# Patient Record
Sex: FEMALE | Race: BLACK OR AFRICAN AMERICAN | Employment: PART TIME | URBAN - METROPOLITAN AREA
[De-identification: names, ages, dates, MRNs, and addresses within clinical notes are randomized per-mention and may not be internally consistent; named-entity substitution may affect disease eponyms.]

---

## 2017-09-18 ENCOUNTER — HOSPITAL ENCOUNTER (OUTPATIENT)
Dept: PHYSICAL THERAPY | Age: 57
Discharge: HOME | End: 2017-09-18
Payer: COMMERCIAL

## 2017-09-18 PROCEDURE — 97110 THERAPEUTIC EXERCISES: CPT

## 2017-09-18 PROCEDURE — 97162 PT EVAL MOD COMPLEX 30 MIN: CPT

## 2017-09-18 PROCEDURE — 97530 THERAPEUTIC ACTIVITIES: CPT

## 2017-09-18 NOTE — PROGRESS NOTES
[address]  [facility] PHYSICAL THERAPY  [address] [facility] J[address]elyn Vir[address]ontes, [address] - Phone: [phone]  Fax: [phone] / 0692 [facility]  Patient Name: Shakeel Barcenas : 1960   Medical   Diagnosis: Lower back pain [M54.5] Treatment Diagnosis: LBP w/ LLE radiculopathy   Onset Date:  w/ exacerbation 1 month ago AGE: 61 y.o. Referral Source: Willow English MD [facility]): 2017   Prior Hospitalization: See medical history Provider #: [id number]   Prior Level of Function: Sedentary lifestyle with intermittent LBP/radiculopathy since    Comorbidities: BMI   Medications: Verified on Patient Summary List   The Plan of Care and following information is based on the information from the initial evaluation.   =======================================================================================  Assessment / key information:  Patient is a 61 y.o. female who presents with chief complaint of low back pain with associated LLE pain. Pt reports initial onset of LBP in  following lifting incident at work. Patient states she found relief with conservative treatment following this injury including PT and cortisone injections. Pt presents to PT evaluation today with recent exacerbation of lumbar radicular symptoms x 1 month. Pt demonstrates s/s consistent with L/S posterior derangement. Pt demo dec sensation to light touch L lateral and anterior thigh down to L ankle. Patient responded very well to L/S extension repeated movements demonstrating ability to centralize symptoms to L lateral thigh in prone position. In addition to radicular symptoms, patient presents with dec LLE strength, dec LE flexibility, dec and painful bed mobility/transfers, dec sitting/standing tolerance and currently reports being unable to work.   Patient would benefit from skilled PT services to address these issues and improve the above mentioned impairments. Thank you for this referral       Objective Data:    LE Strength                   Strength (1-5)  Hip Left Right   Flexion (L1,L2) 4 5   Extension 2- 4   Abduction 3+ 4+   Adduction   NT   ER 4 4+   IR 4 4+   Knee Left Right   Extension (L3,L4) 3+ 5   Flexion (S1,S2) 4+ 5   Ankle Left Right   PF (S1) NT NT   DF (L4) 5 5       ======================================================================================  Eval Complexity: History: HIGH Complexity :3+ comorbidities / personal factors will impact the outcome/ POC Exam:MEDIUM Complexity : 3 Standardized tests and measures addressing body structure, function, activity limitation and / or participation in recreation  Presentation: MEDIUM Complexity : Evolving with changing characteristics  Clinical Decision Making:MEDIUM Complexity : FOTO score of 26-74Overall Complexity:MEDIUM  Problem List: pain affecting function, decrease ROM, decrease strength, edema affecting function, impaired gait/ balance, decrease ADL/ functional abilitiies, decrease activity tolerance, decrease flexibility/ joint mobility and decrease transfer abilities   Treatment Plan may include any combination of the following: Therapeutic exercise, Therapeutic activities, Neuromuscular re-education, Physical agent/modality, Gait/balance training, Manual therapy, Aquatic therapy, Patient education, Self Care training, Functional mobility training and Home safety training  Patient / Family readiness to learn indicated by: asking questions, trying to perform skills and interest  Persons(s) to be included in education: patient (P)  Barriers to Learning/Limitations: yes;  financial and physical  Measures taken:    Patient Goal (s): Decrease pain, improve function and avoid surgery. Patient self reported health status: good  Rehabilitation Potential: good   Short Term Goals: To be accomplished in  3  Weeks:  1.  Patient will be compliant with HEP in order to facilitate progression of therapeutic exercise and improve mobility  2. Patient will demonstrate ability to independently centralize LLE radicular symptoms to level of L/S to dec LLE dysfunction  3. Patient will demonstrate bed mobility with LBP < 5/10 in order to improve ease with transfers     Long Term Goals: To be accomplished in  6  Weeks:  1. Patient will be independent with HEP in order to demonstrate ability to perform therapeutic exercises and continue progressing functional mobility upon D/C  2. Patient will demonstrate bed mobility without inc LBP in order to improve ease with transfers   3. Patient will improve FOTO score to 43% to demonstrate a meaningful improvement in functional mobility and increased quality of life  4. Patient will demonstrate ability to bend/lift 20# box without inc LBP in order to return to work tasks. 5. Patient will report ability to stand > 30 minutes without inc LLE symptoms in order to return work duties. Frequency / Duration:   Patient to be seen  2  times per week for 6  weeks:  Patient / Caregiver education and instruction: self care, activity modification and exercises  G-Codes (GP): n/a  Therapist Signature: Madeline Mcgill DPT Date: 7/12/4464   Certification Period: n/a Time: 8:53 AM   ===========================================================================================  I certify that the above Physical Therapy Services are being furnished while the patient is under my care. I agree with the treatment plan and certify that this therapy is necessary. Physician Signature:        Date:       Time:     Please sign and return to [employer] or you may fax the signed copy to [phone]. Thank you.

## 2017-09-18 NOTE — PROGRESS NOTES
PHYSICAL THERAPY - DAILY TREATMENT NOTE    Patient Name: Karla Steward        Date: 2017  : 1960   YES Patient  Verified  Visit #:     Insurance: Payor: [employer] [address] / Plan: Nelli Alicea / Product Type: Workers Comp /      In time: 1:05 Out time: 2:00   Total Treatment Time: 55 min     TREATMENT AREA =  LBP w/ LLE radiculopathy    SUBJECTIVE    Pain Level (on 0 to 10 scale):  7  / 10   Medication Changes/New allergies or changes in medical history, any new surgeries or procedures? NO    If yes, update Summary List   Subjective Functional Status/Changes:  []  No changes reported     Patient reports onset of low back pain in  after lifting something heavy at work. Patient reports PT after this time with good reports that she had injections as well. Patient reports being relatively pain-free with the ocassional aches since this time. Pt states about 3 weeks ago she came home from work and woke up at night with extreme pain in the low back and into her left leg. Pt states that she is out of work right now due to the pain. Pt reports her left leg has a throbbing pain and sometimes some numbness on the side on the side of the leg. Patient reports the pain in her left leg can get so bad that she can't put any weight on her left leg. States that the pain moves to different spots in the left leg. Pt reports the left leg pain stops at the ankle.      Occupation: [employer] employee          OBJECTIVE    Physical Therapy Evaluation - Lumbar Spine ([facility])    SUBJECTIVE  Chief Complaint: LBP w/ LLE radiculopathy    Mechanism of injury: Injury lifting at work in     Occupation/Recreation: Employee at [address] [place] [address]  Prior level of function: Intermittent LBP, sedentary lifestyle  Present functional limitations: Unable to work, decreased standing/sitting tolerance, sleep, ADLs and self care duties  What position do you sleep in?: Prone and sidelying    Symptoms:  Pain rating (0-10): Today: 7/10   Best: 5/10   Worst: 10/10   [x] Contstant:    [] Intermittent:     Aggravated by:   [] Bending [x] Sitting [x] Standing [] Walking   [] Moving [] Cough [] Sneeze [] Valsalva   [] AM  [] PM  Lying:  [] sup   [] pro   [] sidelying   [] Other:     Eased by: Medication   [] Bending [] Sitting [] Standing [] Walking   [] Moving [] AM  [] PM  Lying: [] sup  [] pro  [] sidelying   [] Other:     General Health:  Red Flags Indicated? [] Yes    [] No  [] Yes [x] No Recent weight change (If yes, due to dieting?  [] Yes  [] No)   [x] Yes [] No Weakness in legs during walking  [] Yes [x] No Unremitting pain at night  [] Yes [x] No Abdominal pain or problems  [] Yes [x] No Rectal bleeding  [] Yes [x] No Feet more cold or painful in cold weather  [] Yes [x] No Menstrual irregularities  [] Yes [x] No Blood or pain with urination  [] Yes [x] No Dysfunction of bowel or bladder  [] Yes [x] No Recent illness within past 3 weeks (i.e, cold, flu)  [] Yes [x] No Numbness/tingling in buttock/genitalia region    Past History/Treatments:     Diagnostic Tests: [] Lab work [] X-rays    [] CT [] MRI     [] Other:  Results: None    OBJECTIVE  Posture:  Lateral Shift: [x] R    [] L     [x] +  [] -  Kyphosis: [x] Increased [] Decreased   []  WNL  Lordosis:  [x] Increased [] Decreased   [] WNL    Gait:  [] Normal     [x] Abnormal: bilateral trendelenburg gait, antalgic gait pattern, dec gait speed, slight inc R lateral lean, dec step length bilaterally    Active Movements: [] N/A   [] Too acute   [] Other:  ROM AROM Comments:pain, area   Forward flexion 40-60 Fingertips to distal tibia    Extension 20-30 WNL    SB right 20-30 NT    SB left 20-30 NT    Rotation right 5-10 NT    Rotation left 5-10 NT       Repeated Movements   Effects on present pain: produces (CT), abolishes (A), increases (incr), decreases(decr), centralizes (C), peripheral (PH), no effect (NE)   Pre-Test Sx Flexion Repeated Flexion Extension Repeated Extension Repeated SBL Repeated SBR   Sitting 7/10 p! LLE         Standing  Incr incr NE Decr     Lying    C C N/A N/A       LE Strength       Strength (1-5)  Hip Left Right   Flexion (L1,L2) 4 5   Extension 2- 4   Abduction 3+ 4+   Adduction  NT   ER 4 4+   IR 4 4+   Knee Left Right   Extension (L3,L4) 3+ 5   Flexion (S1,S2) 4+ 5   Ankle Left Right   PF (S1) NT NT   DF (L4) 5 5       Neuro Screen [] WNL  Myotome/Dermatome/Reflexes: Decr sensation to light touch LLE vs RLE    Dural Mobility:  SLR Sitting: [] R    [x] L    [] +    [x] -  @ (degrees):           Supine: [] R    [x] L    [] +    [x] -  @ (degrees):   Slump Test: [] R    [x] L    [] +    [x] -  @ (degrees):   Prone Knee Bend: [] R    [x] L    [] +    [x] -     Palpation  [] Min  [x] Mod  [x] Severe    Location: Lateral thigh, anterior tibia, L lumbar paraspinals, L gluteals    15 min Therapeutic Exercise:  [x]  See flow sheet   Rationale:      increase ROM, increase strength, improve coordination and increase proprioception to improve the patients ability to centralize L/S symptoms to reduce LE dysfunction     10 min Therapeutic Activity: FOTO   Rationale: To assess current functional limitations and review POC    throughout min Patient Education:  YES  Reviewed HEP   []  Progressed/Changed HEP based on: Other Objective/Functional Measures:    See objective data above     Post Treatment Pain Level (on 0 to 10) scale:   5 / 10     ASSESSMENT    Assessment/Changes in Function:     Patient History: High (BMI, Inability to work, Chronic LBP, Sedentary lifestyle)  Examination (low 1-2, mod 3+, high 4+): Moderate per objective above  Clinical Presentation (low stable or uncomplicated, mod evolving or changing, high unstable or unpredictable):  Moderate  Clinical Decision Making (low , mod 26-74, high 1-25): FOTO Moderate     [x]  See Progress Note/Recertification   Patient will continue to benefit from skilled PT services to modify and progress therapeutic interventions, address functional mobility deficits, address ROM deficits, address strength deficits, analyze and address soft tissue restrictions, analyze and cue movement patterns, analyze and modify body mechanics/ergonomics, assess and modify postural abnormalities, address imbalance/dizziness and instruct in home and community integration to attain remaining goals.    Progress toward goals / Updated goals:    See plan of care     PLAN    [x]  Upgrade activities as tolerated YES Continue plan of care   []  Discharge due to :    []  Other:

## 2017-09-21 ENCOUNTER — HOSPITAL ENCOUNTER (OUTPATIENT)
Dept: PHYSICAL THERAPY | Age: 57
Discharge: HOME | End: 2017-09-21
Payer: COMMERCIAL

## 2017-09-21 PROCEDURE — 97110 THERAPEUTIC EXERCISES: CPT

## 2017-09-21 PROCEDURE — 97530 THERAPEUTIC ACTIVITIES: CPT

## 2017-09-21 NOTE — PROGRESS NOTES
PHYSICAL THERAPY - DAILY TREATMENT NOTE    Patient Name: Madeleine Camacho        Date: 2017  : 1960   yes Patient  Verified  Visit #:     Insurance: Payor: [employer] / Plan: Harley Boast / Product Type:  Workers Comp /      In time: 56 Out time: 1112   Total Treatment Time: 42     TREATMENT AREA =  Lower back pain [M54.5]    SUBJECTIVE  Pain Level (on 0 to 10 scale):  6  / 10   Medication Changes/New allergies or changes in medical history, any new surgeries or procedures?    no  If yes, update Summary List   Subjective Functional Status/Changes:  []  No changes reported     \"I feel it down my L leg some today, but sometimes it gets numb and it feels like my leg will give out\"          OBJECTIVE  Modalities Rationale:     decrease inflammation and decrease pain to improve patient's ability to perform ADLs/ bending/stooping/ lifting/prolong sitting, stding and amb/ stairs with ease '   min [] Estim, type/location:                                      []  att     []  unatt     []  w/US     []  w/ice    []  w/heat    min []  Mechanical Traction: type/lbs                   []  pro   []  sup   []  int   []  cont    []  before manual    []  after manual    min []  Ultrasound, settings/location:      min []  Iontophoresis w/ dexamethasone, location:                                               []  take home patch       []  in clinic   10 min [x]  Ice     []  Heat    location/position: prone with wedge under LEs      min []  Vasopneumatic Device, press/temp:     min []  Other:    [x] Skin assessment post-treatment (if applicable):    [x]  intact    []  redness- no adverse reaction     []redness  adverse reaction:        24 min Therapeutic Exercise:  [x]  See flow sheet   Rationale:      increase ROM, increase strength and improve coordination to improve the patients ability to perform ADLs/ bending/stooping/ lifting/prolong sitting, stding and amb/ stairs with ease        8 min Therapeutic Activity: postural/bed mobility training     Rationale:    increase ROM and increase strength to improve       min Patient Education:  yes  Reviewed HEP   []  Progressed/Changed HEP based on: Pt ed on importance and benefits of compliance with HEP, core strength/stability and proper posture; pt verbalized understanding          Other Objective/Functional Measures:  PITA-B/B decrease p! REIL/ROGERIO-centralized to L/s   peripheralization with seated hip flex; abolished with ROGERIO HOC R; instructed to perform every hr x 10    VCs + demo to perform proper technique for TE    Reviewed proper bed mobility, lifting techniques and importance and benefits of a neutral spine   Post Treatment Pain Level (on 0 to 10) scale:   3  / 10 L/s     ASSESSMENT  Assessment/Changes in Function:     Progressed there-ex without c/o increase p! []  See Progress Note/Recertification   Patient will continue to benefit from skilled PT services to modify and progress therapeutic interventions, address functional mobility deficits, address ROM deficits, address strength deficits, analyze and address soft tissue restrictions, analyze and cue movement patterns, analyze and modify body mechanics/ergonomics, assess and modify postural abnormalities and instruct in home and community integration to attain remaining goals.    Progress toward goals / Updated goals:    Pt's first visit since IE, no noted progress        PLAN  []  Upgrade activities as tolerated yes Continue plan of care   []  Discharge due to :    []  Other:      Therapist: Rd Jara PTA    Date: 9/21/2017 Time: 3:36 PM     Future Appointments  Date Time Provider Elijah Rodriguez   9/26/2017 1:30 PM Cynthia Ariza PT [facility]   9/27/2017 1:30 PM [facility]   10/2/2017 1:00 PM [facility]   10/4/2017 1:30 PM [facility]   10/9/2017 1:00 PM [facility]   10/11/2017 1:00 PM Rd Jara PTA [facility] 10/16/2017 1:00 PM Zuhair Barragan PTA [facility]   10/18/2017 1:00 PM [facility]   10/23/2017 1:00 PM Dong [facility]   10/25/2017 1:30 PM Dong [facility]   10/30/2017 1:00 PM Zuhair Barragan PTA [facility]

## 2017-09-26 ENCOUNTER — HOSPITAL ENCOUNTER (OUTPATIENT)
Dept: PHYSICAL THERAPY | Age: 57
Discharge: HOME | End: 2017-09-26
Payer: COMMERCIAL

## 2017-09-26 PROCEDURE — 97110 THERAPEUTIC EXERCISES: CPT

## 2017-09-26 NOTE — PROGRESS NOTES
PHYSICAL THERAPY - DAILY TREATMENT NOTE    Patient Name: Ector Inman        Date: 2017  : 1960   YES Patient  Verified  Visit #:   3   of   12  Insurance: Payor: [employer] [address] / Plan: Yash Raya / Product Type: Workers Comp /      In time: 1:30 Out time: 2:20   Total Treatment Time: 50     TREATMENT AREA = Lower back pain [M54.5]    SUBJECTIVE    Pain Level (on 0 to 10 scale):  0  / 10   Medication Changes/New allergies or changes in medical history, any new surgeries or procedures? NO    If yes, update Summary List   Subjective Functional Status/Changes:  []  No changes reported     \"I've been better since last week. I don't have any pain today. \"           OBJECTIVE    40 min Therapeutic Exercise:  [x]  See flow sheet   Rationale:      increase ROM, increase strength/stability, facilitate proper motor control. Modalities Rationale: Prophylaxis/Palliative    min [] Estim, type/location:                                     []  att     []  unatt     []  w/US     []  w/ice    []  W/heat    []  before manual    []  after manual    min []  Mechanical Traction: type/lbs                   []  pro   []  sup   []  int   []  cont    []  before manual    []  after manual    min []  Ultrasound, settings/location:      min []  Iontophoresis w/ dexamethasone, location:                                               []  take home patch       []  in clinic   10 min [x]  Ice     []  Heat    Position/location: Prone, L/S    min []  Vasopneumatic Device, press/temp:    [] Skin assessment post-treatment (if applicable):    []  intact    []  redness- no adverse reaction     []redness  adverse reaction:      Throughout Rx min Patient Education:  YES   Reviewed HEP   []  Progressed/Changed HEP based on:   Display of proper form in clinic. Improvement in condition and complaints     Other Objective/Functional Measures: Added new stabilization therex per flow sheet.    Attempted seated march, but peripheralized. Performed REIL/ROGERIO throughout treatment b/w sets/reps/exercises as needed as prophylaxis and symptoms management. Post Treatment Pain Level (on 0 to 10) scale:   0  / 10     ASSESSMENT    Assessment/Changes in Function:     Derangement not fully reduced      []  See Progress Note/Recertification   Patient will continue to benefit from skilled PT services to modify and progress therapeutic interventions, address functional mobility deficits, address ROM deficits, address strength deficits, analyze and address soft tissue restrictions, analyze and cue movement patterns, analyze and modify body mechanics/ergonomics and instruct in home and community integration  to attain remaining goals   Progress toward goals / Updated goals:    Centralizing well with ROGERIO     PLAN    [x]  Upgrade activities as tolerated YES Continue plan of care   []  Discharge due to :    []  Other:      Therapist: Sherri Rolon \"EMELINA\" Bridger Hoodr, DPT, Cert. MDT, Cert. DN, Cert.  SMT    Date: 9/26/2017 Time: 1:40 PM   Future Appointments  Date Time Provider Elijah Rodriguez   9/27/2017 1:30 PM Steff [facility]   10/2/2017 1:00 PM Iven Divine, [facility]   10/4/2017 1:30 PM Iven Divine, [facility]   10/9/2017 1:00 PM Iven Divine, [facility]   10/11/2017 1:00 PM Iven Divine, [facility]   10/16/2017 1:00 PM Iven Divine, [facility]   10/18/2017 1:00 PM Iven Divine, [facility]   10/23/2017 1:00 PM Iven Divine, [facility]   10/25/2017 1:30 PM Iven Divine, [facility]   10/30/2017 1:00 PM Iven Divine, [facility]

## 2017-09-27 ENCOUNTER — HOSPITAL ENCOUNTER (OUTPATIENT)
Dept: PHYSICAL THERAPY | Age: 57
Discharge: HOME | End: 2017-09-27
Payer: COMMERCIAL

## 2017-09-27 PROCEDURE — 97110 THERAPEUTIC EXERCISES: CPT

## 2017-09-27 PROCEDURE — 97140 MANUAL THERAPY 1/> REGIONS: CPT

## 2017-09-27 NOTE — PROGRESS NOTES
PHYSICAL THERAPY - DAILY TREATMENT NOTE    Patient Name: Jaqueline Rao        Date: 2017  : 1960   yes Patient  Verified  Visit #:     Insurance: Payor: [employer] [address] [address] / Plan: Dedra Lovett / Product Type:  Workers Comp /      In time: 130 Out time: 225   Total Treatment Time: 55     TREATMENT AREA =  Lower back pain [M54.5]    SUBJECTIVE  Pain Level (on 0 to 10 scale):  4 / 10   Medication Changes/New allergies or changes in medical history, any new surgeries or procedures?    no  If yes, update Summary List   Subjective Functional Status/Changes:  []  No changes reported     \"I didn't have any pain yesterday, I don't know why I had pain today\"          OBJECTIVE  Modalities Rationale:     decrease inflammation and decrease pain to improve patient's ability to perform ADLs/ bending/stooping/ lifting/prolong sitting, stding and amb/ stairs with ease '   min [] Estim, type/location:                                      []  att     []  unatt     []  w/US     []  w/ice    []  w/heat    min []  Mechanical Traction: type/lbs                   []  pro   []  sup   []  int   []  cont    []  before manual    []  after manual    min []  Ultrasound, settings/location:      min []  Iontophoresis w/ dexamethasone, location:                                               []  take home patch       []  in clinic   10 min [x]  Ice     []  Heat    location/position: prone with wedge under LEs      min []  Vasopneumatic Device, press/temp:     min []  Other:    [x] Skin assessment post-treatment (if applicable):    [x]  intact    []  redness- no adverse reaction     []redness  adverse reaction:        35 min Therapeutic Exercise:  [x]  See flow sheet   Rationale:      increase ROM, increase strength and improve coordination to improve the patients ability to perform ADLs/ bending/stooping/ lifting/prolong sitting, stding and amb/ stairs with ease      10 min Manual Therapy: STM/MFR to R lumbarsacral junction/L/s rimma  OP to L/s for ext mobs   Rationale:      decrease pain, increase ROM, increase tissue extensibility, decrease trigger points and increase postural awareness to improve patient's ability to  perform ADLs/prolong stding and amb/stairs with ease      min Patient Education:  yes  Reviewed HEP   []  Progressed/Changed HEP based on: Pt ed on importance and benefits of compliance with HEP, core strength/stability and proper posture; pt verbalized understanding     Other Objective/Functional Measures:    VCs + demo to perform proper technique for TE  initiated 90/90 HS str with belt, seated marches, and HL hip add/abd without c/o p!  demos fair- bridge form   demos fatigue with 90/90 HS str  ROGERIO/REIL-P/B decrease p! 2/10   Post Treatment Pain Level (on 0 to 10) scale:   0 / 10      ASSESSMENT  Assessment/Changes in Function:     Progressed there-ex without c/o increase p!  demos proper bed mobility (I) with min p! []  See Progress Note/Recertification   Patient will continue to benefit from skilled PT services to modify and progress therapeutic interventions, address functional mobility deficits, address ROM deficits, address strength deficits, analyze and address soft tissue restrictions, analyze and cue movement patterns, analyze and modify body mechanics/ergonomics, assess and modify postural abnormalities and instruct in home and community integration to attain remaining goals. Progress toward goals / Updated goals: · Short Term Goals: To be accomplished in  3  Weeks:  1. Patient will be compliant with HEP in order to facilitate progression of therapeutic exercise and improve mobility. MET  2. Patient will demonstrate ability to independently centralize LLE radicular symptoms to level of L/S to dec LLE dysfunction  3. Patient will demonstrate bed mobility with LBP < 5/10 in order to improve ease with transfers. progressing; demos proper bed mobility (I) with min p!     · Long Term Goals:  To be accomplished in  6  Weeks:  1. Patient will be independent with HEP in order to demonstrate ability to perform therapeutic exercises and continue progressing functional mobility upon D/C  2. Patient will demonstrate bed mobility without inc LBP in order to improve ease with transfers   3. Patient will improve FOTO score to 43% to demonstrate a meaningful improvement in functional mobility and increased quality of life  4. Patient will demonstrate ability to bend/lift 20# box without inc LBP in order to return to work tasks.   5. Patient will report ability to stand > 30 minutes without inc LLE symptoms in order to return work duties       PLAN  [x]  Upgrade activities as tolerated yes Continue plan of care   []  Discharge due to :    []  Other:      Therapist: Antonio Haywood PTA    Date: 9/27/2017 Time: 5:42 PM     Future Appointments  Date Time Provider Elijah Rodriguez   10/2/2017 1:00 PM Antonio Poag, [facility]   10/4/2017 1:30 PM Antonio Poag, [facility]   10/9/2017 1:00 PM Antonio Poag, [facility]   10/11/2017 1:00 PM Terressa Poag, [facility]   10/16/2017 1:00 PM Lakshmisa Poag, [facility]   10/18/2017 1:00 PM Antonio Poag, [facility]   10/23/2017 1:00 PM [facility]   10/25/2017 1:30 PM Lakshmisa Poag, [facility]   10/30/2017 1:00 PM Lakshmisa Poag, [facility]

## 2017-10-02 ENCOUNTER — HOSPITAL ENCOUNTER (OUTPATIENT)
Dept: PHYSICAL THERAPY | Age: 57
Discharge: HOME | End: 2017-10-02
Payer: COMMERCIAL

## 2017-10-02 PROCEDURE — 97110 THERAPEUTIC EXERCISES: CPT

## 2017-10-02 PROCEDURE — 97140 MANUAL THERAPY 1/> REGIONS: CPT

## 2017-10-02 NOTE — PROGRESS NOTES
PHYSICAL THERAPY - DAILY TREATMENT NOTE    Patient Name: Isacc Rodriguez        Date: 10/2/2017  : 1960   yes Patient  Verified  Visit #:     Insurance: Payor: [address] / Plan: Jenelle Arredondo / Product Type:  Workers Comp /      In time: 102 Out time: 205   Total Treatment Time: 63     TREATMENT AREA =  Lower back pain [M54.5]    SUBJECTIVE  Pain Level (on 0 to 10 scale): 1 / 10   Medication Changes/New allergies or changes in medical history, any new surgeries or procedures?    no  If yes, update Summary List   Subjective Functional Status/Changes:  []  No changes reported     \"I am just throbbing a little in the L/s\"          OBJECTIVE  Modalities Rationale:     decrease inflammation and decrease pain to improve patient's ability to perform ADLs/ bending/stooping/ lifting/prolong sitting, stding and amb/ stairs with ease '   min [] Estim, type/location:                                      []  att     []  unatt     []  w/US     []  w/ice    []  w/heat    min []  Mechanical Traction: type/lbs                   []  pro   []  sup   []  int   []  cont    []  before manual    []  after manual    min []  Ultrasound, settings/location:      min []  Iontophoresis w/ dexamethasone, location:                                               []  take home patch       []  in clinic   10 min [x]  Ice     []  Heat    location/position: prone with wedge under LEs      min []  Vasopneumatic Device, press/temp:     min []  Other:    [x] Skin assessment post-treatment (if applicable):    [x]  intact    []  redness- no adverse reaction     []redness  adverse reaction:        43 min Therapeutic Exercise:  [x]  See flow sheet   Rationale:      increase ROM, increase strength and improve coordination to improve the patients ability to perform ADLs/ bending/stooping/ lifting/prolong sitting, stding and amb/ stairs with ease      10 min Manual Therapy: STM/MFR to R lumbarsacral junction/L/s rimma  OP to L/s for ext mobs   Rationale:      decrease pain, increase ROM, increase tissue extensibility, decrease trigger points and increase postural awareness to improve patient's ability to  perform ADLs/prolong stding and amb/stairs with ease      min Patient Education:  yes  Reviewed HEP   []  Progressed/Changed HEP based on: Pt ed on importance and benefits of compliance with HEP, core strength/stability and proper posture; pt verbalized understanding     Other Objective/Functional Measures:    VCs + demo to perform proper technique for TE  initiated standing knee flex; 1/2 moon str, and quad str without c/o p!  continues to have PTT in L Lumbosacral junct   Post Treatment Pain Level (on 0 to 10) scale:  0 / 10      ASSESSMENT  Assessment/Changes in Function:   min p! with bed mobility  demos proper posture in sitting   Progressed there-ex without c/o increase p! []  See Progress Note/Recertification   Patient will continue to benefit from skilled PT services to modify and progress therapeutic interventions, address functional mobility deficits, address ROM deficits, address strength deficits, analyze and address soft tissue restrictions, analyze and cue movement patterns, analyze and modify body mechanics/ergonomics, assess and modify postural abnormalities and instruct in home and community integration to attain remaining goals. Progress toward goals / Updated goals: · Short Term Goals: To be accomplished in  3  Weeks:  1. Patient will be compliant with HEP in order to facilitate progression of therapeutic exercise and improve mobility. MET  2. Patient will demonstrate ability to independently centralize LLE radicular symptoms to level of L/S to dec LLE dysfunction. MET  3. Patient will demonstrate bed mobility with LBP < 5/10 in order to improve ease with transfers. progressing; demos proper bed mobility (I) with min p!     · Long Term Goals: To be accomplished in  6  Weeks:  1.  Patient will be independent with HEP in order to demonstrate ability to perform therapeutic exercises and continue progressing functional mobility upon D/C  2. Patient will demonstrate bed mobility without inc LBP in order to improve ease with transfers   3. Patient will improve FOTO score to 43% to demonstrate a meaningful improvement in functional mobility and increased quality of life  4. Patient will demonstrate ability to bend/lift 20# box without inc LBP in order to return to work tasks.   5. Patient will report ability to stand > 30 minutes without inc LLE symptoms in order to return work duties       PLAN  [x]  Upgrade activities as tolerated yes Continue plan of care   []  Discharge due to :    []  Other:      Therapist: Dean Woodard PTA    Date: 10/2/2017 Time: 1:04 PM     Future Appointments  Date Time Provider Elijah Rodriguez   10/2/2017 1:00 PM Dean Woodard [address]   10/4/2017 1:30 PM Dean Woodard, [address]   10/9/2017 1:00 PM Dean Woodard [address]   10/11/2017 1:00 PM Dean Woodard [address]   10/16/2017 1:00 PM Dean Woodard [address]   10/18/2017 1:00 PM Dean Woodard [address]   [facility] [address]   10/25/2017 1:30 PM Dean Woodard [address]   10/30/2017 1:00 PM Dean Woodard [address]

## 2017-10-04 ENCOUNTER — HOSPITAL ENCOUNTER (OUTPATIENT)
Dept: PHYSICAL THERAPY | Age: 57
Discharge: HOME | End: 2017-10-04
Payer: COMMERCIAL

## 2017-10-04 PROCEDURE — 97530 THERAPEUTIC ACTIVITIES: CPT

## 2017-10-04 PROCEDURE — 97110 THERAPEUTIC EXERCISES: CPT

## 2017-10-04 NOTE — PROGRESS NOTES
PHYSICAL THERAPY - DAILY TREATMENT NOTE    Patient Name: Milly Born        Date: 10/4/2017  : 1960   yes Patient  Verified  Visit #:     Insurance: Payor: [employer] / Plan: Halie Godfrey / Product Type: Workers Comp /      In time: 125 Out time: 215   Total Treatment Time: 50     TREATMENT AREA =  Lower back pain [M54.5]    SUBJECTIVE  Pain Level (on 0 to 10 scale): 0 / 10   Medication Changes/New allergies or changes in medical history, any new surgeries or procedures?    no  If yes, update Summary List   Subjective Functional Status/Changes:  []  No changes reported     \"I am pain free today, I feel good\"       OBJECTIVE  Modalities Rationale:    PD      42 min Therapeutic Exercise:  [x]  See flow sheet   Rationale:      increase ROM, increase strength and improve coordination to improve the patients ability to perform ADLs/ bending/stooping/ lifting/prolong sitting, stding and amb/ stairs with ease      8 min Therapeutic Activity: postural/bed mobility training  sit <>std without UE     Rationale:    increase ROM and increase strength to improve the patients ability to perform ADLs/ bending/stooping/ lifting/prolong sitting, stding and amb/ stairs with ease          min Patient Education:  yes  Reviewed HEP   []  Progressed/Changed HEP based on: Pt ed on importance and benefits of compliance with HEP, core strength/stability and proper posture; pt verbalized understanding     Other Objective/Functional Measures:    VCs + demo to perform proper technique for TE  initiated  sit <>std without UE; prone knee flex and hip ext without c/o p!    reviewed proper bending tech; pt able to demo properly      Post Treatment Pain Level (on 0 to 10) scale:  0 / 10      ASSESSMENT  Assessment/Changes in Function:   demos decrease JOSE MARTIN with sit<>std, and required VCs to engage core    Progressed there-ex without c/o increase p!      []  See Progress Note/Recertification   Patient will continue to benefit from skilled PT services to modify and progress therapeutic interventions, address functional mobility deficits, address ROM deficits, address strength deficits, analyze and address soft tissue restrictions, analyze and cue movement patterns, analyze and modify body mechanics/ergonomics, assess and modify postural abnormalities and instruct in home and community integration to attain remaining goals. Progress toward goals / Updated goals: · Short Term Goals: To be accomplished in  3  Weeks:  1. Patient will be compliant with HEP in order to facilitate progression of therapeutic exercise and improve mobility. MET  2. Patient will demonstrate ability to independently centralize LLE radicular symptoms to level of L/S to dec LLE dysfunction. MET  3. Patient will demonstrate bed mobility with LBP < 5/10 in order to improve ease with transfers. MET     · Long Term Goals: To be accomplished in  6  Weeks:  1. Patient will be independent with HEP in order to demonstrate ability to perform therapeutic exercises and continue progressing functional mobility upon D/C  2. Patient will demonstrate bed mobility without inc LBP in order to improve ease with transfers . MET  3. Patient will improve FOTO score to 43% to demonstrate a meaningful improvement in functional mobility and increased quality of life  4. Patient will demonstrate ability to bend/lift 20# box without inc LBP in order to return to work tasks.   5. Patient will report ability to stand > 30 minutes without inc LLE symptoms in order to return work duties       PLAN  [x]  Upgrade activities as tolerated yes Continue plan of care   []  Discharge due to :    []  Other:      Therapist: Pranav Pichardo PTA    Date: 10/4/2017 Time: 2:20 PM     Future Appointments  Date Time Provider Elijah Rodriguez   10/9/2017 1:00 PM [facility]   10/11/2017 1:00 PM [facility]   10/16/2017 1:00 PM Pranav Pichardo PTA [facility] 10/18/2017 1:00 PM [facility]   10/23/2017 1:00 PM [facility]   10/25/2017 1:30 PM [facility]   10/30/2017 1:00 PM Megha Luevano [facility]

## 2017-10-09 ENCOUNTER — HOSPITAL ENCOUNTER (OUTPATIENT)
Dept: PHYSICAL THERAPY | Age: 57
Discharge: HOME | End: 2017-10-09
Payer: COMMERCIAL

## 2017-10-09 PROCEDURE — 97110 THERAPEUTIC EXERCISES: CPT

## 2017-10-09 PROCEDURE — 97530 THERAPEUTIC ACTIVITIES: CPT

## 2017-10-09 NOTE — PROGRESS NOTES
PHYSICAL THERAPY - DAILY TREATMENT NOTE    Patient Name: Francia Sebastian        Date: 10/9/2017  : 1960   yes Patient  Verified  Visit #:     Insurance: Payor: [address] / Plan: Rocio Slot / Product Type: Workers Comp /      In time: 100 Out time: 155   Total Treatment Time: 55     TREATMENT AREA =  Lower back pain [M54.5]    SUBJECTIVE  Pain Level (on 0 to 10 scale): 0 / 10   Medication Changes/New allergies or changes in medical history, any new surgeries or procedures?    no  If yes, update Summary List   Subjective Functional Status/Changes:  []  No changes reported     \"I got some throbbing but not pain. It was a good weekend.  it did not wake me up from the pain\"       OBJECTIVE  Modalities Rationale:     decrease inflammation and decrease pain to improve patient's ability to perform ADLs/ bending/stooping/ lifting/prolong sitting, stding and amb/ stairs with ease      min [] Estim, type/location:                                      []  att     []  unatt     []  w/US     []  w/ice    []  w/heat    min []  Mechanical Traction: type/lbs                   []  pro   []  sup   []  int   []  cont    []  before manual    []  after manual    min []  Ultrasound, settings/location:      min []  Iontophoresis w/ dexamethasone, location:                                               []  take home patch       []  in clinic   10 min [x]  Ice     []  Heat    location/position: prone with wedge under LEs      min []  Vasopneumatic Device, press/temp:     min []  Other:    [x] Skin assessment post-treatment (if applicable):    [x]  intact    []  redness- no adverse reaction     []redness  adverse reaction:            35 min Therapeutic Exercise:  [x]  See flow sheet   Rationale:      increase ROM, increase strength and improve coordination to improve the patients ability to perform ADLs/ bending/stooping/ lifting/prolong sitting, stding and amb/ stairs with ease      10 min Therapeutic Activity: postural training  sit <>std without UE  balance on SL with hip flex<>ext   lifting tech with pillows bed<>chest   Rationale:    increase ROM and increase strength to improve the patients ability to perform ADLs/ bending/stooping/ lifting/prolong sitting, stding and amb/ stairs with ease          min Patient Education:  yes  Reviewed HEP   []  Progressed/Changed HEP based on: Pt ed on importance and benefits of compliance with HEP, core strength/stability and proper posture; pt verbalized understanding     Other Objective/Functional Measures:    VCs + demo to perform proper technique for TE  initiated prone quad str hip flex<>ext/abd ; lifting, and performed stding knee flex without c.o p! reviewed proper lifting tech with pillow bed<>chest   Post Treatment Pain Level (on 0 to 10) scale:  0 / 10      ASSESSMENT  Assessment/Changes in Function:     Progressed there-ex without c/o increase p!  demos proper lifting tech with VCs, no increase p! []  See Progress Note/Recertification   Patient will continue to benefit from skilled PT services to modify and progress therapeutic interventions, address functional mobility deficits, address ROM deficits, address strength deficits, analyze and address soft tissue restrictions, analyze and cue movement patterns, analyze and modify body mechanics/ergonomics, assess and modify postural abnormalities and instruct in home and community integration to attain remaining goals. Progress toward goals / Updated goals: · Short Term Goals: To be accomplished in  3  Weeks:  1. Patient will be compliant with HEP in order to facilitate progression of therapeutic exercise and improve mobility. MET  2. Patient will demonstrate ability to independently centralize LLE radicular symptoms to level of L/S to dec LLE dysfunction. MET  3. Patient will demonstrate bed mobility with LBP < 5/10 in order to improve ease with transfers. MET     · Long Term Goals:  To be accomplished in  6 Weeks: 1. Patient will be independent with HEP in order to demonstrate ability to perform therapeutic exercises and continue progressing functional mobility upon D/C  2. Patient will demonstrate bed mobility without inc LBP in order to improve ease with transfers . MET  3. Patient will improve FOTO score to 43% to demonstrate a meaningful improvement in functional mobility and increased quality of life  4. Patient will demonstrate ability to bend/lift 20# box without inc LBP in order to return to work tasks.   5. Patient will report ability to stand > 30 minutes without inc LLE symptoms in order to return work duties       PLAN  [x]  Upgrade activities as tolerated yes Continue plan of care   []  Discharge due to :    []  Other:      Therapist: Percy Reno PTA    Date: 10/9/2017 Time: 2:20 PM     Future Appointments  Date Time Provider Elijah Rodriguez   10/11/2017 1:00 PM [facility]   10/16/2017 1:00 PM [facility]   10/18/2017 1:00 PM [facility]   10/23/2017 1:00 PM [facility]   10/25/2017 1:30 PM [facility]   10/30/2017 1:00 PM Percy Reno PTA [facility]

## 2017-10-11 ENCOUNTER — HOSPITAL ENCOUNTER (OUTPATIENT)
Dept: PHYSICAL THERAPY | Age: 57
Discharge: HOME | End: 2017-10-11
Payer: COMMERCIAL

## 2017-10-11 PROCEDURE — 97110 THERAPEUTIC EXERCISES: CPT

## 2017-10-11 NOTE — PROGRESS NOTES
PHYSICAL THERAPY - DAILY TREATMENT NOTE    Patient Name: Adela Lazo        Date: 10/11/2017  : 1960   YES Patient  Verified  Visit #:     Insurance: Payor: [address] / Plan: Ander Antonio / Product Type: Workers Comp /      In time: 1:00 Out time: 155   Total Treatment Time: 55     TREATMENT AREA = Lower back pain [M54.5]    SUBJECTIVE    Pain Level (on 0 to 10 scale):  0  / 10   Medication Changes/New allergies or changes in medical history, any new surgeries or procedures? NO    If yes, update Summary List   Subjective Functional Status/Changes:  []  No changes reported     \"I'm good today. \"          OBJECTIVE    45 min Therapeutic Exercise:  [x]  See flow sheet   Rationale:      increase ROM, increase strength/stability, facilitate proper motor control. \Modalities Rationale: Prophylaxis/Palliative\   \ min [] Estim, type/location: \                                    []  att     []  unatt     []  w/US     []  w/ice    []  W/heat    []  before manual    []  after manual   \ min []  Mechanical Traction: type/lbs \                  []  pro   []  sup   []  int   []  cont    []  before manual    []  after manual    min []  Ultrasound, settings/location:      min []  Iontophoresis w/ dexamethasone, location:                                               []  take home patch       []  in clinic   10 min [x]  Ice     []  Heat    Position/location: prone    min []  Vasopneumatic Device, press/temp:    [] Skin assessment post-treatment (if applicable):    []  intact    []  redness- no adverse reaction     []redness  adverse reaction:      Throughout Rx min Patient Education:  YES   Reviewed HEP   []  Progressed/Changed HEP based on:   Display of proper form in clinic.   Improvement in condition and complaints     Other Objective/Functional Measures:    Advanced March/LAQ to DD  -produced L LBP with L hip flexion during march, NBNW. VC's to reduced ROM to alleviate      Post Treatment Pain Level (on 0 to 10) scale:   0-  / 10     ASSESSMENT    Assessment/Changes in Function:     Difficulty with bed mobility due to body habitus, not pain. []  See Progress Note/Recertification   Patient will continue to benefit from skilled PT services to modify and progress therapeutic interventions, address functional mobility deficits, address ROM deficits, address strength deficits, analyze and address soft tissue restrictions, analyze and cue movement patterns, analyze and modify body mechanics/ergonomics and instruct in home and community integration  to attain remaining goals   Progress toward goals / Updated goals:    Progressing well in pain relief. PLAN    [x]  Upgrade activities as tolerated YES Continue plan of care   []  Discharge due to :    []  Other:      Therapist: Edd Boo \"BJ\" EDWAR Henderson, Cert. MDT, Cert. DN, Cert.  SMT    Date: 10/11/2017 Time: 1:16 PM   Future Appointments  Date Time Provider Elijah Rodriguez   10/16/2017 1:00 PM [facility]   10/18/2017 1:00 PM [facility]   10/23/2017 1:00 PM [facility]   10/25/2017 1:30 PM [facility]   10/30/2017 1:00 PM [facility]

## 2017-10-16 ENCOUNTER — HOSPITAL ENCOUNTER (OUTPATIENT)
Dept: PHYSICAL THERAPY | Age: 57
Discharge: HOME | End: 2017-10-16
Payer: COMMERCIAL

## 2017-10-16 PROCEDURE — 97110 THERAPEUTIC EXERCISES: CPT

## 2017-10-16 PROCEDURE — 97530 THERAPEUTIC ACTIVITIES: CPT

## 2017-10-16 NOTE — PROGRESS NOTES
PHYSICAL THERAPY - DAILY TREATMENT NOTE    Patient Name: Ebony Bolaños        Date: 10/16/2017  : 1960   yes Patient  Verified  Visit #:     Insurance: Payor: Jacquelin Garcia / Plan: León Merida / Product Type: Workers Comp /      In time: 100 Out time: 210   Total Treatment Time: 70     TREATMENT AREA =  Lower back pain [M54.5]    SUBJECTIVE  Pain Level (on 0 to 10 scale): 0 / 10   Medication Changes/New allergies or changes in medical history, any new surgeries or procedures?    no  If yes, update Summary List   Subjective Functional Status/Changes:  []  No changes reported     \"I am throbbing some but not bad.  I did my HEP over the weekend\"       OBJECTIVE  Modalities Rationale:     decrease inflammation and decrease pain to improve patient's ability to perform ADLs/ bending/stooping/ lifting/prolong sitting, stding and amb/ stairs with ease      min [] Estim, type/location:                                      []  att     []  unatt     []  w/US     []  w/ice    []  w/heat    min []  Mechanical Traction: type/lbs                   []  pro   []  sup   []  int   []  cont    []  before manual    []  after manual    min []  Ultrasound, settings/location:      min []  Iontophoresis w/ dexamethasone, location:                                               []  take home patch       []  in clinic   10 min [x]  Ice     []  Heat    location/position: prone with wedge under LEs      min []  Vasopneumatic Device, press/temp:     min []  Other:    [x] Skin assessment post-treatment (if applicable):    [x]  intact    []  redness- no adverse reaction     []redness  adverse reaction:            50 min Therapeutic Exercise:  [x]  See flow sheet   Rationale:      increase ROM, increase strength and improve coordination to improve the patients ability to perform ADLs/ bending/stooping/ lifting/prolong sitting, stding and amb/ stairs with ease      10 min Therapeutic Activity: postural training  sit <>std with 5# KB  balance on airex SL with hip flex<>ext   lifting 5# KB bed<>chest   Rationale:    increase ROM and increase strength to improve the patients ability to perform ADLs/ bending/stooping/ lifting/prolong sitting, stding and amb/ stairs with ease          min Patient Education:  yes  Reviewed HEP   []  Progressed/Changed HEP based on: Pt ed on importance and benefits of compliance with HEP, core strength/stability and proper posture; pt verbalized understanding     Other Objective/Functional Measures:    VCs + demo to perform proper technique for TE  initiated tband walkaways; airex to 3 way hip; T/s ext in chair, and doorway str without c/o p!  demos p! free proper 5# KB lifting from mat<>chest  c/o throbbing in L/s post CP; abolished with ROGERIO over bar   Post Treatment Pain Level (on 0 to 10) scale:  0 / 10      ASSESSMENT  Assessment/Changes in Function:     Progressed there-ex without c/o increase p!  p! FREE lifting   demos proper posture throughout     []  See Progress Note/Recertification   Patient will continue to benefit from skilled PT services to modify and progress therapeutic interventions, address functional mobility deficits, address ROM deficits, address strength deficits, analyze and address soft tissue restrictions, analyze and cue movement patterns, analyze and modify body mechanics/ergonomics, assess and modify postural abnormalities and instruct in home and community integration to attain remaining goals. Progress toward goals / Updated goals: · Short Term Goals: To be accomplished in  3  Weeks:  1. Patient will be compliant with HEP in order to facilitate progression of therapeutic exercise and improve mobility. MET  2. Patient will demonstrate ability to independently centralize LLE radicular symptoms to level of L/S to dec LLE dysfunction. MET  3. Patient will demonstrate bed mobility with LBP < 5/10 in order to improve ease with transfers. MET     · Long Term Goals:  To be accomplished in  6  Weeks:  1. Patient will be independent with HEP in order to demonstrate ability to perform therapeutic exercises and continue progressing functional mobility upon D/C  2. Patient will demonstrate bed mobility without inc LBP in order to improve ease with transfers . MET  3. Patient will improve FOTO score to 43% to demonstrate a meaningful improvement in functional mobility and increased quality of life  4. Patient will demonstrate ability to bend/lift 20# box without inc LBP in order to return to work tasks.   5. Patient will report ability to stand > 30 minutes without inc LLE symptoms in order to return work duties       PLAN  [x]  Upgrade activities as tolerated yes Continue plan of care   []  Discharge due to :    []  Other:      Therapist: Pranav Pichardo PTA    Date: 10/16/2017 Time: 2:20 PM     Future Appointments  Date Time Provider Elijah Rodriguez   10/16/2017 1:00 PM [facility]   10/18/2017 1:00 PM [facility]   10/23/2017 1:00 PM [facility]   10/25/2017 1:30 PM [facility]

## 2017-10-18 ENCOUNTER — HOSPITAL ENCOUNTER (OUTPATIENT)
Dept: PHYSICAL THERAPY | Age: 57
Discharge: HOME | End: 2017-10-18
Payer: COMMERCIAL

## 2017-10-18 PROCEDURE — 97530 THERAPEUTIC ACTIVITIES: CPT

## 2017-10-18 PROCEDURE — 97110 THERAPEUTIC EXERCISES: CPT

## 2017-10-18 NOTE — PROGRESS NOTES
PHYSICAL THERAPY - DAILY TREATMENT NOTE    Patient Name: Ayanna Shen        Date: 10/18/2017  : 1960   yes Patient  Verified  Visit #:   10 of   12  Insurance: Payor: Joaquin Renteria / Plan: Kell Sorto / Product Type: Workers Comp /      In time: 105 Out time: 158   Total Treatment Time: 53     TREATMENT AREA =  Lower back pain [M54.5]    SUBJECTIVE  Pain Level (on 0 to 10 scale): 0 / 10   Medication Changes/New allergies or changes in medical history, any new surgeries or procedures?    no  If yes, update Summary List   Subjective Functional Status/Changes:  []  No changes reported     \"I feel good. I am not hurting today.  I was ok after new exercises last visit\"       OBJECTIVE  Modalities Rationale:     decrease inflammation and decrease pain to improve patient's ability to perform ADLs/ bending/stooping/ lifting/prolong sitting, stding and amb/ stairs with ease      min [] Estim, type/location:                                      []  att     []  unatt     []  w/US     []  w/ice    []  w/heat    min []  Mechanical Traction: type/lbs                   []  pro   []  sup   []  int   []  cont    []  before manual    []  after manual    min []  Ultrasound, settings/location:      min []  Iontophoresis w/ dexamethasone, location:                                               []  take home patch       []  in clinic   10 min [x]  Ice     []  Heat    location/position: prone with wedge under LEs      min []  Vasopneumatic Device, press/temp:     min []  Other:    [x] Skin assessment post-treatment (if applicable):    [x]  intact    []  redness- no adverse reaction     []redness  adverse reaction:            33 min Therapeutic Exercise:  [x]  See flow sheet   Rationale:      increase ROM, increase strength and improve coordination to improve the patients ability to perform ADLs/ bending/stooping/ lifting/prolong sitting, stding and amb/ stairs with ease      10 min Therapeutic Activity: postural training  sit <>std with 5# KB  balance on airex SL with hip flex<>ext   lifting 5# KB bed<>chest   Rationale:    increase ROM and increase strength to improve the patients ability to perform ADLs/ bending/stooping/ lifting/prolong sitting, stding and amb/ stairs with ease          min Patient Education:  yes  Reviewed HEP   []  Progressed/Changed HEP based on: Pt ed on importance and benefits of compliance with HEP, core strength/stability and proper posture; pt verbalized understanding     Other Objective/Functional Measures:    VCs + demo to perform proper technique for TE  initiated tband rows/ext with min t/s p!; abolished with ROGERIO over bar  VCs to decrease distance from lifting 5# KB off mat  demos proper posture 100% of rx    no LOB with airex balance training with 2 finger support   Post Treatment Pain Level (on 0 to 10) scale:  0 / 10      ASSESSMENT  Assessment/Changes in Function:     Progressed there-ex without c/o increase p! []  See Progress Note/Recertification   Patient will continue to benefit from skilled PT services to modify and progress therapeutic interventions, address functional mobility deficits, address ROM deficits, address strength deficits, analyze and address soft tissue restrictions, analyze and cue movement patterns, analyze and modify body mechanics/ergonomics, assess and modify postural abnormalities and instruct in home and community integration to attain remaining goals. Progress toward goals / Updated goals: · Short Term Goals: To be accomplished in  3  Weeks:  1. Patient will be compliant with HEP in order to facilitate progression of therapeutic exercise and improve mobility. MET  2. Patient will demonstrate ability to independently centralize LLE radicular symptoms to level of L/S to dec LLE dysfunction. MET  3. Patient will demonstrate bed mobility with LBP < 5/10 in order to improve ease with transfers. MET     · Long Term Goals:  To be accomplished in  6 Weeks: 1. Patient will be independent with HEP in order to demonstrate ability to perform therapeutic exercises and continue progressing functional mobility upon D/C  2. Patient will demonstrate bed mobility without inc LBP in order to improve ease with transfers . MET  3. Patient will improve FOTO score to 43% to demonstrate a meaningful improvement in functional mobility and increased quality of life  4. Patient will demonstrate ability to bend/lift 20# box without inc LBP in order to return to work tasks.   5. Patient will report ability to stand > 30 minutes without inc LLE symptoms in order to return work duties       PLAN  [x]  Upgrade activities as tolerated yes Continue plan of care   []  Discharge due to :    []  Other:      Therapist: Megha Luevano PTA    Date: 10/18/2017 Time: 6:52 PM     Future Appointments  Date Time Provider Elijah Rodriguez   10/23/2017 1:00 PM [facility]   10/25/2017 1:30 PM Megha Luevano PTA [facility]

## 2017-10-23 ENCOUNTER — HOSPITAL ENCOUNTER (OUTPATIENT)
Dept: PHYSICAL THERAPY | Age: 57
End: 2017-10-23
Payer: COMMERCIAL

## 2017-10-25 ENCOUNTER — HOSPITAL ENCOUNTER (OUTPATIENT)
Dept: PHYSICAL THERAPY | Age: 57
Discharge: HOME | End: 2017-10-25
Payer: COMMERCIAL

## 2017-10-25 PROCEDURE — 97530 THERAPEUTIC ACTIVITIES: CPT

## 2017-10-25 PROCEDURE — 97110 THERAPEUTIC EXERCISES: CPT

## 2017-10-25 NOTE — PROGRESS NOTES
[address] [facility] PHYSICAL THERAPY  [address], [place], [address] - Phone: [phone]  Fax: [phone] SUMMARY  Patient Name: Ester Ledesma : 1960   Treatment/Medical Diagnosis: Lower back pain [M54.5]   Referral Source: Jess English MD     Date of Initial Visit: 17 Attended Visits: 11 Missed Visits: 1     SUMMARY OF TREATMENT  Pt's rx consist of an LE/core/L/sstrengthening/stability/stretching TE, CP post rx, and instruction on HEP + proper body mechanics  CURRENT STATUS  Pt has made excellent progress as evidence by achieving all LTGs;  Pt reports compliance + [place] with HEP; and  demo TE in gym properly without increase p!. Pt demos p! free for 20# box lift floor<>chest x 5; pt unable at [facility]. Pt demos (I) p! free bed mobility. Pt increased FOTO 42 points from 30 to 67;  indicating improved functional mobility  Goal/Measure of Progress Goal Met? 1. Patient will be independent with HEP in order to demonstrate ability to perform therapeutic exercises and continue progressing functional mobility upon D/C   Status at last Eval: establish HEP Current Status:  Pt reports compliance + [place] with HEP, yes   2. Patient will demonstrate bed mobility without inc LBP in order to improve ease with transfers   Status at last Eval: p! with bed mobility Current Status: no p! with (I) with proper bed mobility yes   3. Patient will improve FOTO score to 43% to demonstrate a meaningful improvement in functional mobility and increased quality of life   Status at last Eval: 30 Current Status: 72 yes   4. Patient will demonstrate ability to bend/lift 20# box without inc LBP in order to return to work tasks. Status at last Eval: unable Current Status: p! free for 20# box lift floor<>chest x 5 yes     5.   Patient will report ability to stand > 30 minutes without inc LLE symptoms in order to return work duties   Status at last Eval: unable Current Status: pt reports ability to stand an hour without L LE sxs; 5/10 L/s p! yes     RECOMMENDATIONS  Pt has achieved all LTGs. Pt D/C with instructions to cont HEP Independently. If you have any questions/comments please contact us directly at [phone]. Thank you for allowing us to assist in the care of your patient. LPTA Signature: Diane Serum, PTA Date: 10/25/17   Therapist Signature: Nahid Covert \"BJ\" Dawna Necessary, DPT, Cert. MDT, Cert. DN, Cert.  SMT Time: 3:01 PM

## 2017-10-30 ENCOUNTER — APPOINTMENT (OUTPATIENT)
Dept: PHYSICAL THERAPY | Age: 57
End: 2017-10-30
Payer: COMMERCIAL

## 2019-06-27 ENCOUNTER — HOSPITAL ENCOUNTER (EMERGENCY)
Age: 59
Discharge: HOME | End: 2019-06-27
Attending: EMERGENCY MEDICINE
Payer: MEDICAID

## 2019-06-27 ENCOUNTER — APPOINTMENT (OUTPATIENT)
Dept: GENERAL RADIOLOGY | Age: 59
End: 2019-06-27
Attending: PHYSICIAN ASSISTANT
Payer: MEDICAID

## 2019-06-27 VITALS
TEMPERATURE: 97.5 F | WEIGHT: 250 LBS | BODY MASS INDEX: 44.3 KG/M2 | HEART RATE: 83 BPM | RESPIRATION RATE: 17 BRPM | HEIGHT: 63 IN | OXYGEN SATURATION: 100 % | DIASTOLIC BLOOD PRESSURE: 54 MMHG | SYSTOLIC BLOOD PRESSURE: 114 MMHG

## 2019-06-27 DIAGNOSIS — N39.0 URINARY TRACT INFECTION WITHOUT HEMATURIA, SITE UNSPECIFIED: ICD-10-CM

## 2019-06-27 DIAGNOSIS — R42 LIGHTHEADEDNESS: Primary | ICD-10-CM

## 2019-06-27 LAB
ALBUMIN SERPL-MCNC: 3.8 G/DL (ref 3.4–5)
ALBUMIN/GLOB SERPL: 0.8 {RATIO} (ref 0.8–1.7)
ALP SERPL-CCNC: 143 U/L (ref 45–117)
ALT SERPL-CCNC: 44 U/L (ref 13–56)
ANION GAP SERPL CALC-SCNC: 11 MMOL/L (ref 3–18)
APPEARANCE UR: ABNORMAL
AST SERPL-CCNC: 19 U/L (ref 15–37)
BACTERIA URNS QL MICRO: ABNORMAL /HPF
BASOPHILS # BLD: 0 K/UL (ref 0–0.1)
BASOPHILS NFR BLD: 0 % (ref 0–2)
BILIRUB SERPL-MCNC: 0.5 MG/DL (ref 0.2–1)
BILIRUB UR QL: ABNORMAL
BUN SERPL-MCNC: 25 MG/DL (ref 7–18)
BUN/CREAT SERPL: 14 (ref 12–20)
CALCIUM SERPL-MCNC: 9.4 MG/DL (ref 8.5–10.1)
CHLORIDE SERPL-SCNC: 92 MMOL/L (ref 100–108)
CK MB CFR SERPL CALC: 1.3 % (ref 0–4)
CK MB SERPL-MCNC: 2 NG/ML (ref 5–25)
CK SERPL-CCNC: 160 U/L (ref 26–192)
CO2 SERPL-SCNC: 28 MMOL/L (ref 21–32)
COLOR UR: ABNORMAL
CREAT SERPL-MCNC: 1.84 MG/DL (ref 0.6–1.3)
DIFFERENTIAL METHOD BLD: ABNORMAL
EOSINOPHIL # BLD: 0 K/UL (ref 0–0.4)
EOSINOPHIL NFR BLD: 0 % (ref 0–5)
EPITH CASTS URNS QL MICRO: ABNORMAL /LPF (ref 0–5)
ERYTHROCYTE [DISTWIDTH] IN BLOOD BY AUTOMATED COUNT: 14 % (ref 11.6–14.5)
GLOBULIN SER CALC-MCNC: 4.9 G/DL (ref 2–4)
GLUCOSE SERPL-MCNC: 263 MG/DL (ref 74–99)
GLUCOSE UR STRIP.AUTO-MCNC: >1000 MG/DL
HCT VFR BLD AUTO: 46.2 % (ref 35–45)
HGB BLD-MCNC: 15.2 G/DL (ref 12–16)
HGB UR QL STRIP: ABNORMAL
KETONES UR QL STRIP.AUTO: ABNORMAL MG/DL
LEUKOCYTE ESTERASE UR QL STRIP.AUTO: ABNORMAL
LYMPHOCYTES # BLD: 1.7 K/UL (ref 0.9–3.6)
LYMPHOCYTES NFR BLD: 23 % (ref 21–52)
MCH RBC QN AUTO: 28.1 PG (ref 24–34)
MCHC RBC AUTO-ENTMCNC: 32.9 G/DL (ref 31–37)
MCV RBC AUTO: 85.6 FL (ref 74–97)
MONOCYTES # BLD: 0.9 K/UL (ref 0.05–1.2)
MONOCYTES NFR BLD: 12 % (ref 3–10)
NEUTS SEG # BLD: 4.7 K/UL (ref 1.8–8)
NEUTS SEG NFR BLD: 65 % (ref 40–73)
NITRITE UR QL STRIP.AUTO: POSITIVE
PH UR STRIP: 5 [PH] (ref 5–8)
PLATELET # BLD AUTO: 313 K/UL (ref 135–420)
PMV BLD AUTO: 12 FL (ref 9.2–11.8)
POTASSIUM SERPL-SCNC: 4.8 MMOL/L (ref 3.5–5.5)
PROT SERPL-MCNC: 8.7 G/DL (ref 6.4–8.2)
PROT UR STRIP-MCNC: 300 MG/DL
RBC # BLD AUTO: 5.4 M/UL (ref 4.2–5.3)
RBC #/AREA URNS HPF: ABNORMAL /HPF (ref 0–5)
SODIUM SERPL-SCNC: 131 MMOL/L (ref 136–145)
SP GR UR REFRACTOMETRY: 1.03 (ref 1–1.03)
TROPONIN I SERPL-MCNC: <0.02 NG/ML (ref 0–0.04)
UROBILINOGEN UR QL STRIP.AUTO: 1 EU/DL (ref 0.2–1)
WBC # BLD AUTO: 7.2 K/UL (ref 4.6–13.2)
WBC URNS QL MICRO: ABNORMAL /HPF (ref 0–4)

## 2019-06-27 PROCEDURE — 81001 URINALYSIS AUTO W/SCOPE: CPT

## 2019-06-27 PROCEDURE — 80053 COMPREHEN METABOLIC PANEL: CPT

## 2019-06-27 PROCEDURE — 93005 ELECTROCARDIOGRAM TRACING: CPT

## 2019-06-27 PROCEDURE — 99285 EMERGENCY DEPT VISIT HI MDM: CPT

## 2019-06-27 PROCEDURE — 85025 COMPLETE CBC W/AUTO DIFF WBC: CPT

## 2019-06-27 PROCEDURE — 74011250637 HC RX REV CODE- 250/637: Performed by: EMERGENCY MEDICINE

## 2019-06-27 PROCEDURE — 82550 ASSAY OF CK (CPK): CPT

## 2019-06-27 PROCEDURE — 74011250636 HC RX REV CODE- 250/636: Performed by: EMERGENCY MEDICINE

## 2019-06-27 PROCEDURE — 96360 HYDRATION IV INFUSION INIT: CPT

## 2019-06-27 PROCEDURE — 71045 X-RAY EXAM CHEST 1 VIEW: CPT

## 2019-06-27 PROCEDURE — 96361 HYDRATE IV INFUSION ADD-ON: CPT

## 2019-06-27 RX ORDER — NITROFURANTOIN 25; 75 MG/1; MG/1
100 CAPSULE ORAL 2 TIMES DAILY
Status: DISCONTINUED | OUTPATIENT
Start: 2019-06-27 | End: 2019-06-27

## 2019-06-27 RX ORDER — NITROFURANTOIN 25; 75 MG/1; MG/1
100 CAPSULE ORAL 2 TIMES DAILY
Qty: 6 CAP | Refills: 0 | Status: SHIPPED | OUTPATIENT
Start: 2019-06-27 | End: 2019-06-30

## 2019-06-27 RX ORDER — NITROFURANTOIN 25; 75 MG/1; MG/1
100 CAPSULE ORAL
Status: COMPLETED | OUTPATIENT
Start: 2019-06-27 | End: 2019-06-27

## 2019-06-27 RX ADMIN — SODIUM CHLORIDE 1000 ML: 900 INJECTION, SOLUTION INTRAVENOUS at 16:48

## 2019-06-27 RX ADMIN — NITROFURANTOIN MONOHYDRATE/MACROCRYSTALLINE 100 MG: 25; 75 CAPSULE ORAL at 17:09

## 2019-06-27 NOTE — ED NOTES
Patient discharged and given discharge instructions. Pateint had an opportunity to ask questions. Pateint verbalized understanding of discharge instructions. Patient d/c from ED ambulatory, discharge instructions and prescriptions in hand. Patient accompanied by family.

## 2019-06-27 NOTE — LETTER
[facility] EMERGENCY DEPT 
[address] [facility] [address] [phone] 
[phone] Work/School Note Date: 6/27/2019 To Whom It May concern: 
 
Valeriano Anderson was seen and treated today in the emergency room by the following provider(s): 
Attending Provider: Dmitry Proctor MD.   
 
Valeriano Anderson may return to work on 6/29/19. Sincerely, Elva Hollins RN

## 2019-06-27 NOTE — ED TRIAGE NOTES
3 days of headache, mouth dry, cold sweats. Dark urine. Vaginal discharge.  Tried monostat several days ago

## 2019-06-27 NOTE — DISCHARGE INSTRUCTIONS

## 2019-06-28 LAB
ATRIAL RATE: 119 BPM
CALCULATED P AXIS, ECG09: 70 DEGREES
CALCULATED R AXIS, ECG10: 48 DEGREES
CALCULATED T AXIS, ECG11: 25 DEGREES
DIAGNOSIS, 93000: NORMAL
P-R INTERVAL, ECG05: 152 MS
Q-T INTERVAL, ECG07: 310 MS
QRS DURATION, ECG06: 70 MS
QTC CALCULATION (BEZET), ECG08: 436 MS
VENTRICULAR RATE, ECG03: 119 BPM